# Patient Record
Sex: MALE | Race: WHITE
[De-identification: names, ages, dates, MRNs, and addresses within clinical notes are randomized per-mention and may not be internally consistent; named-entity substitution may affect disease eponyms.]

---

## 2017-09-25 ENCOUNTER — HOSPITAL ENCOUNTER (EMERGENCY)
Dept: HOSPITAL 53 - M ED | Age: 32
Discharge: LEFT BEFORE DISCHARGE | End: 2017-09-25
Payer: COMMERCIAL

## 2017-09-25 VITALS
DIASTOLIC BLOOD PRESSURE: 70 MMHG | SYSTOLIC BLOOD PRESSURE: 132 MMHG | BODY MASS INDEX: 24.82 KG/M2 | HEIGHT: 74 IN | WEIGHT: 193.41 LBS

## 2017-09-25 DIAGNOSIS — K08.89: Primary | ICD-10-CM

## 2017-09-25 DIAGNOSIS — Z53.29: ICD-10-CM

## 2017-10-10 ENCOUNTER — HOSPITAL ENCOUNTER (EMERGENCY)
Dept: HOSPITAL 53 - M ED | Age: 32
Discharge: HOME | End: 2017-10-10
Payer: SELF-PAY

## 2017-10-10 VITALS — BODY MASS INDEX: 24.93 KG/M2 | WEIGHT: 194.23 LBS | HEIGHT: 74 IN

## 2017-10-10 VITALS — DIASTOLIC BLOOD PRESSURE: 80 MMHG | SYSTOLIC BLOOD PRESSURE: 133 MMHG

## 2017-10-10 DIAGNOSIS — K04.7: Primary | ICD-10-CM

## 2017-10-10 DIAGNOSIS — K02.9: ICD-10-CM

## 2017-10-10 DIAGNOSIS — F33.9: ICD-10-CM

## 2017-10-10 DIAGNOSIS — F17.210: ICD-10-CM

## 2017-10-10 DIAGNOSIS — F41.9: ICD-10-CM

## 2017-10-10 DIAGNOSIS — Z79.899: ICD-10-CM

## 2021-08-23 ENCOUNTER — HOSPITAL ENCOUNTER (OUTPATIENT)
Dept: HOSPITAL 53 - M ED | Age: 36
Discharge: HOME | End: 2021-08-23
Attending: SURGERY
Payer: MEDICAID

## 2021-08-23 VITALS — HEIGHT: 74 IN | WEIGHT: 178.79 LBS | BODY MASS INDEX: 22.95 KG/M2

## 2021-08-23 VITALS — DIASTOLIC BLOOD PRESSURE: 74 MMHG | SYSTOLIC BLOOD PRESSURE: 130 MMHG

## 2021-08-23 DIAGNOSIS — T18.128A: ICD-10-CM

## 2021-08-23 DIAGNOSIS — K21.9: ICD-10-CM

## 2021-08-23 DIAGNOSIS — K22.8: ICD-10-CM

## 2021-08-23 DIAGNOSIS — K20.90: Primary | ICD-10-CM
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
## 2021-08-23 DIAGNOSIS — F17.290: ICD-10-CM

## 2021-08-23 DIAGNOSIS — F41.9: ICD-10-CM

## 2021-08-23 DIAGNOSIS — K22.10: ICD-10-CM

## 2021-08-23 DIAGNOSIS — F32.9: ICD-10-CM

## 2021-08-23 DIAGNOSIS — E87.6: ICD-10-CM

## 2021-08-23 LAB
BASOPHILS # BLD AUTO: 0 10^3/UL (ref 0–0.2)
BASOPHILS NFR BLD AUTO: 0.4 % (ref 0–1)
EOSINOPHIL # BLD AUTO: 0.2 10^3/UL (ref 0–0.5)
EOSINOPHIL NFR BLD AUTO: 2.5 % (ref 0–3)
HCT VFR BLD AUTO: 45.3 % (ref 42–52)
HGB BLD-MCNC: 15.3 G/DL (ref 13.5–17.5)
LYMPHOCYTES # BLD AUTO: 1.9 10^3/UL (ref 1.5–5)
LYMPHOCYTES NFR BLD AUTO: 23.5 % (ref 24–44)
MCH RBC QN AUTO: 30.4 PG (ref 27–33)
MCHC RBC AUTO-ENTMCNC: 33.8 G/DL (ref 32–36.5)
MCV RBC AUTO: 90.1 FL (ref 80–96)
MONOCYTES # BLD AUTO: 0.5 10^3/UL (ref 0–0.8)
MONOCYTES NFR BLD AUTO: 6 % (ref 2–8)
NEUTROPHILS # BLD AUTO: 5.4 10^3/UL (ref 1.5–8.5)
NEUTROPHILS NFR BLD AUTO: 67.2 % (ref 36–66)
PLATELET # BLD AUTO: 179 10^3/UL (ref 150–450)
RBC # BLD AUTO: 5.03 10^6/UL (ref 4.3–6.1)
WBC # BLD AUTO: 8.1 10^3/UL (ref 4–10)

## 2021-08-23 PROCEDURE — 96365 THER/PROPH/DIAG IV INF INIT: CPT

## 2021-08-23 PROCEDURE — 88305 TISSUE EXAM BY PATHOLOGIST: CPT

## 2021-08-23 PROCEDURE — 85025 COMPLETE CBC W/AUTO DIFF WBC: CPT

## 2021-08-23 PROCEDURE — 96376 TX/PRO/DX INJ SAME DRUG ADON: CPT

## 2021-08-23 PROCEDURE — 80047 BASIC METABLC PNL IONIZED CA: CPT

## 2021-08-23 PROCEDURE — 43247 EGD REMOVE FOREIGN BODY: CPT

## 2021-08-23 PROCEDURE — 99284 EMERGENCY DEPT VISIT MOD MDM: CPT

## 2021-08-23 PROCEDURE — 43239 EGD BIOPSY SINGLE/MULTIPLE: CPT

## 2021-08-23 PROCEDURE — 96366 THER/PROPH/DIAG IV INF ADDON: CPT

## 2021-08-23 RX ADMIN — POTASSIUM CHLORIDE SCH MLS/HR: 7.46 INJECTION, SOLUTION INTRAVENOUS at 14:27

## 2021-08-23 RX ADMIN — POTASSIUM CHLORIDE SCH MLS/HR: 7.46 INJECTION, SOLUTION INTRAVENOUS at 17:36

## 2021-08-23 NOTE — HPEPDOC
General Surgery H&P


Date of Admission


Aug 23, 2021


Attending Physician:  HARVEY HAM MD





History and Physical


Reports history of depression CHIEF COMPLAINT: Throat pain, difficulty 

swallowing





HISTORY OF PRESENT ILLNESS: 





Patient presented himself to the emergency room complaining of throat pain, 

difficulty swallowing, unable to tolerate any oral intake, be it liquids or 

solids since last night when he felt he was unable to completely swallow a piece

of steak for dinner last evening.  He reports he had about 3 small bites when 

the food got lodged in his throat and since then was feeling uncomfortable, 

having difficulty swallowing his saliva, unable to dislodge it by drinking, 

coughing, retching.  Denies any chest pain or abdominal pain.  He reports he has

been having problems swallowing hearing there especially with steak or other 

hard solid foods but most of the time was able to undo the food impaction 

himself.  He does smoke, only vapes since about 2 weeks ago but used to smoke a 

pack a day.  Denies significant alcohol intake.  Reports that he has history of 

Tineo's esophagus.  Reports occasional reflux.  Is not on any antiulcer 

medication.  Does not take any medications regularly.  Denies any unexplained 

weight loss.  He has dentures both upper and lower teeth and was not wearing it 

last night.  No prior endoscopies.





ALLERGIES: Please see below.





HOME MEDICATIONS: Please see below.





PAST MEDICAL HISTORY:


1.  Reports history of anxiety and depression, currently not on any medications.





PAST SURGICAL HISTORY:


1.  Denies any prior surgical history





PERSONAL/SOCIAL HISTORY: Reports smoking, denies drinking alcohol, recreational 

drug use





REVIEW OF SYSTEMS: 


GENERAL: Was in his usual state of health prior to food getting stuck in his 

throat last night.


HEENT: Denies problems with vision or hearing.  Normal voice.  Denies 

hoarseness..


NECK: Denies any neck pain.   


CARDIOVASCULAR: Denies chest pain and palpitations.


MUSCULOSKELETAL: Reports back pain


SKIN: Denies rash.


NEUROLOGIC: Denies headache, stroke and transient ischemic attack.


PSYCHIATRIC: Reports history of anxiety and depression on any medication.


HEMATOLOGY/ONCOLOGY: Denies any bleeding or clotting disorder.


HEART: Denies any chest pains, palpitations, paroxysmal dyspnea, orthopnea.


PULMONARY: Denies chronic cough, dyspnea and wheezing.


GASTROINTESTINAL: See HPI.


GENITOURINARY: Denies dysuria, frequency, hematuria and nocturia.


ENDOCRINE: Denies polydipsia, polyphagia, polyuria, heat or cold intolerance.


INFECTIOUS: Denies any recent upper respiratory tract infection, UTI, need for 

use of antibiotics.


NUTRITION: Reports good appetite.





PHYSICAL EXAMINATION:


VITAL SIGNS: Please see below.


GENERAL APPEARANCE: Patient seen laying on his side, relatively comfortable in 

appearance, was holding an emesis bag.. Awake, alert, oriented.   


HEENT: Normocephalic, normal voice.  No hoarseness..  


CHEST: No chest wall abnormalities. Normal respiratory motion/effort.


NECK: Supple. No thyromegaly. No lymphadenopathies.  


LUNGS: Lung sounds are clear to auscultation bilaterally. No wheezing 

appreciated.  


HEART: No chest wall abnormalities. Heart rate and rhythm are regular with no 

murmurs.  


ABDOMEN: Soft abdomen, moderately protuberant, rounded.  Nondistended, 

nontender. 


SKIN: Warm and dry.


EXTREMITIES: No significant extremity edema.


NEUROLOGICAL: Awake, alert and oriented


.


ANCILLARIES: .





LABORATORY DATA: Please see below.


Potassium is 2.4


MICROBIOLOGY: Please see below.





IMAGING:


No relevant imaging.





IMPRESSION AND PLAN:


Food bolus impaction seems to be at the mid esophageal level from his complaints

of discomfort right below the throat level or above the sternum


Hypokalemia will replace the potassium in his IV given that he could not 

tolerate anything orally at this point.





I will schedule the patient for upper endoscopy soon as a room is available.  I 

discussed with the patient the details of the proposed procedure, the benefits 

of performing the procedure, the most common risks on doing the procedure. This 

may include significant risk for aspiration given the food impaction, risks for 

bleeding and perforation as well as adverse drug reaction. I have given him a 

chance to ask questions, voice out concerns. Patient has agreed to proceed





I will place him on IV Protonix in the meantime.





Vital Signs





Vital Signs








  Date Time  Temp Pulse Resp B/P (MAP) Pulse Ox O2 Delivery O2 Flow Rate FiO2


 


8/23/21 13:23 98.7 64 18 123/74 (90) 100   


 


8/23/21 09:17      Room Air  











Laboratory Data


Labs 24H


Laboratory Tests 2


8/23/21 12:52: 


Immature Granulocyte % (Auto) 0.4, Neutrophils (%) (Auto) 67.2H, Lymphocytes (%)

(Auto) 23.5L, Monocytes (%) (Auto) 6.0, Eosinophils (%) (Auto) 2.5, Basophils 

(%) (Auto) 0.4, Neutrophils # (Auto) 5.4, Lymphocytes # (Auto) 1.9, Monocytes # 

(Auto) 0.5, Eosinophils # (Auto) 0.2, Basophils # (Auto) 0.0, Nucleated Red 

Blood Cells % (auto) 0.0, Coronavirus (COVID-19)(PCR) NEGATIVE


8/23/21 13:02: 


POC Glucose (Misc Panel) 103, POC Sodium (Misc Panel) 149H, POC Potassium (Misc 

Panel) 2.4*L, POC Chloride (Misc Panel) 112H, POC Total CO2 (Misc Panel) 20.0L, 

POC Blood Urea Nitrogen (Misc Panel 8, POC Ionized Calcium (Misc Panel) 3.7L, P

OC Creatinine (Misc Panel) 0.4L, POC Hematocrit (Misc Panel) 30.0L


CBC/BMP


Laboratory Tests


8/23/21 12:52











Home Medications


No Active Prescriptions or Reported Meds





Allergies


Coded Allergies:  


     No Known Allergies (Unverified , 10/10/17)





A-FIB/CHADSVASC


A-FIB History


Current/History of A-Fib/PAF?:  No


Current PO Anticoag Therapy:  No











HARVEY HAM MD         Aug 23, 2021 14:02

## 2021-08-23 NOTE — ROOR
________________________________________________________________________________

Patient Name: Jay Alvarez           Procedure Date: 8/23/2021 7:04 PM

MRN: [id number]                          Account Number: [id number]

YOB: 1985               Age: 36

Room: Main OR                          Gender: Male

Note Status: Finalized                 

________________________________________________________________________________

 

Procedure:            Upper GI endoscopy

Indications:          Foreign body in the esophagus

Providers:            Jayro Cornejo MD

Referring MD:         1. NO/Unknown PCP 1. NO/Unknown PCP, Admin.

Requesting Provider:  

Medicines:            General Anesthesia

Complications:        No immediate complications.

________________________________________________________________________________

Procedure:            Pre-Anesthesia Assessment:

                      - Prior to the procedure, a History and Physical was 

                      performed, and patient medications and allergies were 

                      reviewed. The patient is competent. The risks and 

                      benefits of the procedure and the sedation options and 

                      risks were discussed with the patient. All questions 

                      were answered and informed consent was obtained. Patient 

                      identification and proposed procedure were verified by 

                      the physician, the nurse and the anesthesiologist in the 

                      procedure room. Mental Status Examination: alert and 

                      oriented. Airway Examination: normal oropharyngeal 

                      airway and neck mobility. Respiratory Examination: clear 

                      to auscultation. CV Examination: normal. Prophylactic 

                      Antibiotics: The patient does not require prophylactic 

                      antibiotics. Prior Anticoagulants: The patient has taken 

                      no previous anticoagulant or antiplatelet agents. ASA 

                      Grade Assessment: II - A patient with mild systemic 

                      disease. After reviewing the risks and benefits, the 

                      patient was deemed in satisfactory condition to undergo 

                      the procedure. The anesthesia plan was to use general 

                      anesthesia. Immediately prior to administration of 

                      medications, the patient was re-assessed for adequacy to 

                      receive sedatives. The heart rate, respiratory rate, 

                      oxygen saturations, blood pressure, adequacy of 

                      pulmonary ventilation, and response to care were 

                      monitored throughout the procedure. The physical status 

                      of the patient was re-assessed after the procedure.

                      The Endoscope was introduced through the mouth, and 

                      advanced to the second part of duodenum. The upper GI 

                      endoscopy was somewhat difficult due to due to food 

                      impaction. Successful completion of the procedure was 

                      aided by Initially done under sedation converted to GA. 

                      The patient tolerated the procedure well.

                                                                                

Findings:

     concentric rings (tracheal appearance) on upper and middle esophagus, 

     whitish nodules/papules, deep furrows in the lower esophagus consistent 

     with eosinophilic esophagitis. No strictures found, This was biopsied 

     with a cold forceps for evaluation of eosinophilic esophagitis.

     The entire examined stomach was normal.

     The first portion of the duodenum and second portion of the duodenum were 

     normal.

     The Z-line was irregular and was found 38 cm from the incisors. This was 

     biopsied with a cold forceps for histology.

                                                                                

Impression:           - Normal stomach.

                      - Normal first portion of the duodenum and second 

                      portion of the duodenum.

                      - Z-line irregular, 38 cm from the incisors. Biopsied.

Recommendation:       - Await pathology results.

                      - Use Protonix (pantoprazole) 40 mg PO daily for 6 weeks.

                      - Return to my office in 2 weeks.

                                                                                

Procedure Code(s):    --- Professional ---

                      33969, Esophagogastroduodenoscopy, flexible, transoral; 

                      with biopsy, single or multiple

Diagnosis Code(s):    --- Professional ---

                      K22.8, Other specified diseases of esophagus

                      T18.108A, Unspecified foreign body in esophagus causing 

                      other injury, initial encounter

 

CPT copyright 2019 [employer]. All rights reserved.

 

The codes documented in this report are preliminary and upon  review may 

be revised to meet current compliance requirements.

Attending Participation:

     I personally performed the entire procedure.

                                                                                

 

Jayro Cornejo MD

_______________________

Jayro Cornejo MD

8/23/2021 7:42:30 PM

Electronically signed by Jayro Cornejo MD

Number of Addenda: 0

 

Note Initiated On: 8/23/2021 7:04 PM

Estimated Blood Loss: Estimated blood loss was minimal.

## 2022-01-05 ENCOUNTER — HOSPITAL ENCOUNTER (OUTPATIENT)
Dept: HOSPITAL 53 - M LABSMTC | Age: 37
End: 2022-01-05
Attending: PEDIATRICS
Payer: COMMERCIAL

## 2022-01-05 DIAGNOSIS — Z20.822: Primary | ICD-10-CM

## 2025-05-02 ENCOUNTER — HOSPITAL ENCOUNTER (OUTPATIENT)
Dept: HOSPITAL 53 - M OPP | Age: 40
Discharge: HOME | End: 2025-05-02
Attending: SURGERY
Payer: COMMERCIAL

## 2025-05-02 VITALS — SYSTOLIC BLOOD PRESSURE: 138 MMHG | DIASTOLIC BLOOD PRESSURE: 78 MMHG | OXYGEN SATURATION: 98 %

## 2025-05-02 VITALS — TEMPERATURE: 97.1 F

## 2025-05-02 DIAGNOSIS — Q39.4: ICD-10-CM

## 2025-05-02 DIAGNOSIS — K21.00: Primary | ICD-10-CM

## 2025-05-02 DIAGNOSIS — F17.210: ICD-10-CM

## 2025-05-02 DIAGNOSIS — Z79.899: ICD-10-CM

## 2025-05-02 DIAGNOSIS — R13.10: ICD-10-CM

## 2025-05-02 PROCEDURE — 88305 TISSUE EXAM BY PATHOLOGIST: CPT

## 2025-05-02 PROCEDURE — 43239 EGD BIOPSY SINGLE/MULTIPLE: CPT
